# Patient Record
Sex: MALE | Race: BLACK OR AFRICAN AMERICAN | Employment: FULL TIME | URBAN - METROPOLITAN AREA
[De-identification: names, ages, dates, MRNs, and addresses within clinical notes are randomized per-mention and may not be internally consistent; named-entity substitution may affect disease eponyms.]

---

## 2019-04-05 ENCOUNTER — HOSPITAL ENCOUNTER (OUTPATIENT)
Age: 34
Setting detail: SPECIMEN
Discharge: HOME | End: 2019-04-05
Payer: COMMERCIAL

## 2019-04-05 ENCOUNTER — OFFICE VISIT (OUTPATIENT)
Dept: FAMILY MEDICINE CLINIC | Age: 34
End: 2019-04-05
Payer: COMMERCIAL

## 2019-04-05 VITALS
HEIGHT: 72 IN | HEART RATE: 66 BPM | DIASTOLIC BLOOD PRESSURE: 104 MMHG | BODY MASS INDEX: 42.66 KG/M2 | SYSTOLIC BLOOD PRESSURE: 168 MMHG | WEIGHT: 315 LBS

## 2019-04-05 DIAGNOSIS — L70.0 ACNE VULGARIS: ICD-10-CM

## 2019-04-05 DIAGNOSIS — R03.0 ELEVATED BLOOD PRESSURE READING: ICD-10-CM

## 2019-04-05 DIAGNOSIS — R20.2 TINGLING OF BOTH FEET: Primary | ICD-10-CM

## 2019-04-05 DIAGNOSIS — R73.03 PREDIABETES: ICD-10-CM

## 2019-04-05 DIAGNOSIS — R20.2 TINGLING OF BOTH FEET: ICD-10-CM

## 2019-04-05 LAB
ABSOLUTE EOS #: 0.12 K/UL (ref 0–0.44)
ABSOLUTE IMMATURE GRANULOCYTE: <0.03 K/UL (ref 0–0.3)
ABSOLUTE LYMPH #: 1.8 K/UL (ref 1.1–3.7)
ABSOLUTE MONO #: 0.76 K/UL (ref 0.1–1.2)
ALBUMIN SERPL-MCNC: 4.9 G/DL (ref 3.5–5.2)
ALBUMIN/GLOBULIN RATIO: 1.3 (ref 1–2.5)
ALP BLD-CCNC: 54 U/L (ref 40–129)
ALT SERPL-CCNC: 146 U/L (ref 5–41)
ANION GAP SERPL CALCULATED.3IONS-SCNC: 25 MMOL/L (ref 9–17)
AST SERPL-CCNC: 174 U/L
BASOPHILS # BLD: 1 % (ref 0–2)
BASOPHILS ABSOLUTE: 0.04 K/UL (ref 0–0.2)
BILIRUB SERPL-MCNC: 0.82 MG/DL (ref 0.3–1.2)
BILIRUBIN, POC: NEGATIVE
BLOOD URINE, POC: NORMAL
BUN BLDV-MCNC: 11 MG/DL (ref 6–20)
BUN/CREAT BLD: ABNORMAL (ref 9–20)
CALCIUM SERPL-MCNC: 10.4 MG/DL (ref 8.6–10.4)
CHLORIDE BLD-SCNC: 98 MMOL/L (ref 98–107)
CHP ED QC CHECK: PRESENT
CLARITY, POC: CLEAR
CO2: 19 MMOL/L (ref 20–31)
COLOR, POC: YELLOW
CREAT SERPL-MCNC: 0.91 MG/DL (ref 0.7–1.2)
DIFFERENTIAL TYPE: NORMAL
EOSINOPHILS RELATIVE PERCENT: 2 % (ref 1–4)
FOLATE: 8.7 NG/ML
GFR AFRICAN AMERICAN: >60 ML/MIN
GFR NON-AFRICAN AMERICAN: >60 ML/MIN
GFR SERPL CREATININE-BSD FRML MDRD: ABNORMAL ML/MIN/{1.73_M2}
GFR SERPL CREATININE-BSD FRML MDRD: ABNORMAL ML/MIN/{1.73_M2}
GLUCOSE BLD-MCNC: 87 MG/DL (ref 70–99)
GLUCOSE BLD-MCNC: 93 MG/DL
GLUCOSE URINE, POC: NEGATIVE
HBA1C MFR BLD: 6.1 %
HCT VFR BLD CALC: 47.5 % (ref 40.7–50.3)
HEMOGLOBIN: 15.3 G/DL (ref 13–17)
IMMATURE GRANULOCYTES: 0 %
KETONES, POC: NEGATIVE
LEUKOCYTE EST, POC: NEGATIVE
LYMPHOCYTES # BLD: 28 % (ref 24–43)
MCH RBC QN AUTO: 27.9 PG (ref 25.2–33.5)
MCHC RBC AUTO-ENTMCNC: 32.2 G/DL (ref 28.4–34.8)
MCV RBC AUTO: 86.5 FL (ref 82.6–102.9)
MONOCYTES # BLD: 12 % (ref 3–12)
NITRITE, POC: NEGATIVE
NRBC AUTOMATED: 0 PER 100 WBC
PDW BLD-RTO: 13.2 % (ref 11.8–14.4)
PH, POC: 6
PLATELET # BLD: 305 K/UL (ref 138–453)
PLATELET ESTIMATE: NORMAL
PMV BLD AUTO: 11.3 FL (ref 8.1–13.5)
POTASSIUM SERPL-SCNC: 4.4 MMOL/L (ref 3.7–5.3)
PROTEIN, POC: NEGATIVE
RBC # BLD: 5.49 M/UL (ref 4.21–5.77)
RBC # BLD: NORMAL 10*6/UL
SEG NEUTROPHILS: 57 % (ref 36–65)
SEGMENTED NEUTROPHILS ABSOLUTE COUNT: 3.68 K/UL (ref 1.5–8.1)
SODIUM BLD-SCNC: 142 MMOL/L (ref 135–144)
SPECIFIC GRAVITY, POC: 1.02
TOTAL PROTEIN: 8.8 G/DL (ref 6.4–8.3)
UROBILINOGEN, POC: 0.2
VITAMIN B-12: 642 PG/ML (ref 232–1245)
WBC # BLD: 6.4 K/UL (ref 3.5–11.3)
WBC # BLD: NORMAL 10*3/UL

## 2019-04-05 PROCEDURE — 82962 GLUCOSE BLOOD TEST: CPT | Performed by: FAMILY MEDICINE

## 2019-04-05 PROCEDURE — 83036 HEMOGLOBIN GLYCOSYLATED A1C: CPT | Performed by: FAMILY MEDICINE

## 2019-04-05 PROCEDURE — 81002 URINALYSIS NONAUTO W/O SCOPE: CPT | Performed by: FAMILY MEDICINE

## 2019-04-05 PROCEDURE — 99203 OFFICE O/P NEW LOW 30 MIN: CPT | Performed by: FAMILY MEDICINE

## 2019-04-05 RX ORDER — TRETINOIN 1 MG/G
CREAM TOPICAL
Qty: 45 G | Refills: 1 | Status: SHIPPED | OUTPATIENT
Start: 2019-04-05

## 2019-04-05 ASSESSMENT — PATIENT HEALTH QUESTIONNAIRE - PHQ9
SUM OF ALL RESPONSES TO PHQ QUESTIONS 1-9: 0
SUM OF ALL RESPONSES TO PHQ9 QUESTIONS 1 & 2: 0
1. LITTLE INTEREST OR PLEASURE IN DOING THINGS: 0
SUM OF ALL RESPONSES TO PHQ QUESTIONS 1-9: 0
2. FEELING DOWN, DEPRESSED OR HOPELESS: 0

## 2019-04-05 ASSESSMENT — ENCOUNTER SYMPTOMS
DIARRHEA: 0
SHORTNESS OF BREATH: 0
ABDOMINAL PAIN: 0
NAUSEA: 0
BLOOD IN STOOL: 0
VOMITING: 0

## 2019-04-05 NOTE — PROGRESS NOTES
Attending Physician Statement  I have discussed the care of Shelli Whitaker, including pertinent history and exam findings,  with the resident. I have reviewed the key elements of all parts of the encounter with the resident. I agree with the assessment, plan and orders as documented by the resident.   (GE Modifier)

## 2019-04-05 NOTE — PROGRESS NOTES
Future    3. Prediabetes  -Left cell modification    4. Acne vulgaris  -Educated patient on use of 10% benzyl peroxide cream in the morning with face moisturizer and tretinoin cream at night patient able to teach back instructions  - tretinoin (RETIN-A) 0.1 % cream; Apply topically nightly. Dispense: 45 g; Refill: 1        Return in about 1 week (around 4/12/2019) for fu labs and blood pressure. Andria Garsia received counseling on the following healthy behaviors: nutrition, exercise, medication adherence, tobacco cessation and decrease in alcohol consumption  Reviewed prior labs and health maintenance  Continue current medications, diet and exercise. Discussed use, benefit, and side effects of prescribed medications. Barriers to medication compliance addressed. Patient given educational materials - see patient instructions  Was a self-tracking handout given in paper form or via [employer]) by [employer]? No:     Requested Prescriptions     Signed Prescriptions Disp Refills    tretinoin (RETIN-A) 0.1 % cream 45 g 1     Sig: Apply topically nightly. All patient questions answered. Patient voiced understanding. Quality Measures    Body mass index is 45.24 kg/m². Elevated. Weight control planned discussed Healthy diet and regular exercise. BP: (!) 168/104 Blood pressure is high. Treatment plan consists of Weight Reduction, DASH Eating Plan, Dietary Sodium Restriction, Increased Physical Activity, Moderation in Alcohol Consumption and Avoid Tobacco and Second-hand Smoke.     No results found for: LDLCALC, LDLCHOLESTEROL, LDLDIRECT (goal LDL reduction with dx if diabetes is 50% LDL reduction)      PHQ Scores 4/5/2019   PHQ2 Score 0   PHQ9 Score 0     Interpretation of Total Score Depression Severity: 1-4 = Minimal depression, 5-9 = Mild depression, 10-14 = Moderate depression, 15-19 = Moderately severe depression, 20-27 = Severe depression    Requested Prescriptions     Signed Prescriptions Disp Refills    tretinoin (RETIN-A) 0.1 % cream 45 g 1     Sig: Apply topically nightly. There are no discontinued medications.     Chris Argueta MD

## 2019-04-12 ENCOUNTER — OFFICE VISIT (OUTPATIENT)
Dept: FAMILY MEDICINE CLINIC | Age: 34
End: 2019-04-12
Payer: COMMERCIAL

## 2019-04-12 ENCOUNTER — HOSPITAL ENCOUNTER (OUTPATIENT)
Age: 34
Setting detail: SPECIMEN
Discharge: HOME | End: 2019-04-12
Payer: COMMERCIAL

## 2019-04-12 VITALS
DIASTOLIC BLOOD PRESSURE: 95 MMHG | SYSTOLIC BLOOD PRESSURE: 136 MMHG | WEIGHT: 315 LBS | HEIGHT: 72 IN | BODY MASS INDEX: 42.66 KG/M2 | TEMPERATURE: 97.3 F | HEART RATE: 83 BPM

## 2019-04-12 DIAGNOSIS — F10.11 HISTORY OF ALCOHOL ABUSE: ICD-10-CM

## 2019-04-12 DIAGNOSIS — R74.8 ELEVATED LIVER ENZYMES: ICD-10-CM

## 2019-04-12 DIAGNOSIS — R74.8 ELEVATED LIVER ENZYMES: Primary | ICD-10-CM

## 2019-04-12 LAB
FERRITIN: 265 UG/L (ref 30–400)
HAV IGM SER IA-ACNC: NONREACTIVE
HEPATITIS B CORE IGM ANTIBODY: NONREACTIVE
HEPATITIS B SURFACE ANTIGEN: NONREACTIVE
HEPATITIS C ANTIBODY: NONREACTIVE
IRON SATURATION: 17 % (ref 20–55)
IRON: 59 UG/DL (ref 59–158)
TOTAL IRON BINDING CAPACITY: 347 UG/DL (ref 250–450)
UNSATURATED IRON BINDING CAPACITY: 288 UG/DL (ref 112–347)

## 2019-04-12 PROCEDURE — 99213 OFFICE O/P EST LOW 20 MIN: CPT | Performed by: HOSPITALIST

## 2019-04-12 ASSESSMENT — ENCOUNTER SYMPTOMS
COUGH: 0
ABDOMINAL PAIN: 0
WHEEZING: 0
CONSTIPATION: 0
BACK PAIN: 0
SHORTNESS OF BREATH: 0
NAUSEA: 0
DIARRHEA: 0
VOMITING: 0

## 2019-04-12 NOTE — PROGRESS NOTES
Subjective:    Pat Jarquin is a 35 y.o. male with  has a past medical history of Asthma. No family history on file. Presented to the office today for:  Chief Complaint   Patient presents with   Johnathan ARVIZU Pt here for f/u labs  Shows elevated LFT  Has hx of alcohol abuse  Stopped drinking about a month ago  No sx of jaundice, change in bowel movement or fatigue  No hx of IVDA, states only smokes marijuana    Review of Systems   Constitutional: Negative for activity change, appetite change, fatigue and unexpected weight change. Respiratory: Negative for cough, shortness of breath and wheezing. Cardiovascular: Negative for chest pain, palpitations and leg swelling. Gastrointestinal: Negative for abdominal pain, constipation, diarrhea, nausea and vomiting. Genitourinary: Negative for difficulty urinating, dysuria, flank pain and frequency. Musculoskeletal: Negative for arthralgias, back pain and neck pain. Neurological: Negative for dizziness, syncope, weakness, numbness and headaches. Objective:    BP (!) 136/95 (Site: Right Upper Arm, Position: Sitting, Cuff Size: Medium Adult)   Pulse 83   Temp 97.3 °F (36.3 °C) (Temporal)   Ht 6' (1.829 m)   Wt (!) 328 lb 6.4 oz (149 kg)   BMI 44.54 kg/m²    BP Readings from Last 3 Encounters:   04/12/19 (!) 136/95   04/05/19 (!) 168/104   05/14/16 (!) 157/94     Physical Exam   Constitutional: He is oriented to person, place, and time. He appears well-developed and well-nourished. Cardiovascular: Normal rate and regular rhythm. Pulmonary/Chest: Effort normal and breath sounds normal. No respiratory distress. Abdominal: Soft. Bowel sounds are normal. There is no tenderness. Neurological: He is alert and oriented to person, place, and time. Nursing note and vitals reviewed.         Lab Results   Component Value Date    WBC 6.4 04/05/2019    HGB 15.3 04/05/2019    HCT 47.5 04/05/2019     04/05/2019     (H) 04/05/2019     (H) 04/05/2019     04/05/2019    K 4.4 04/05/2019    CL 98 04/05/2019    CREATININE 0.91 04/05/2019    BUN 11 04/05/2019    CO2 19 (L) 04/05/2019    LABA1C 6.1 04/05/2019     Lab Results   Component Value Date    CALCIUM 10.4 04/05/2019     No results found for: LDLCALC, LDLCHOLESTEROL, LDLDIRECT    Assessment:     1. Elevated liver enzymes    2. History of alcohol abuse        Plan:   1. Elevated liver enzymes  - will r/o hepatitis and get US liver for further recs  - Hepatitis Panel, Acute; Future  - Ferritin; Future  - Iron And TIBC; Future  - US LIVER; Future    2. History of alcohol abuse  - US LIVER; Future      Juntereceived counseling on the following healthy behaviors: nutrition, exercise and medication adherence    Patient given educational materials : see patient instruction   Chelsey Villalta received counseling on the following healthy behaviors: nutrition, exercise and medication adherence  Reviewed prior labs and health maintenance. Continue current medications, diet and exercise. Discussed use, benefit, and side effects of prescribed medications. Barriers to medication compliance addressed. Patient given educational materials - see patient instructions. All patient questions answered. Patient voiced understanding. Discussed use, benefit, and side effects of prescribed medications. Barriers to medicationcompliance addressed. All patient questions answered. Pt voiced understanding. There are no discontinued medications. Return in about 2 weeks (around 4/26/2019) for imaging.

## 2019-04-19 ENCOUNTER — HOSPITAL ENCOUNTER (OUTPATIENT)
Dept: ULTRASOUND IMAGING | Age: 34
Discharge: HOME | End: 2019-04-21
Payer: COMMERCIAL

## 2019-04-19 DIAGNOSIS — F10.11 HISTORY OF ALCOHOL ABUSE: ICD-10-CM

## 2019-04-19 DIAGNOSIS — R74.8 ELEVATED LIVER ENZYMES: ICD-10-CM

## 2019-04-19 PROCEDURE — 76705 ECHO EXAM OF ABDOMEN: CPT
